# Patient Record
Sex: FEMALE | Race: WHITE
[De-identification: names, ages, dates, MRNs, and addresses within clinical notes are randomized per-mention and may not be internally consistent; named-entity substitution may affect disease eponyms.]

---

## 2021-12-07 ENCOUNTER — HOSPITAL ENCOUNTER (EMERGENCY)
Dept: HOSPITAL 63 - ER | Age: 46
Discharge: HOME | End: 2021-12-07
Payer: COMMERCIAL

## 2021-12-07 VITALS — SYSTOLIC BLOOD PRESSURE: 118 MMHG | DIASTOLIC BLOOD PRESSURE: 87 MMHG

## 2021-12-07 VITALS — HEIGHT: 67 IN | WEIGHT: 197.98 LBS | BODY MASS INDEX: 31.07 KG/M2

## 2021-12-07 DIAGNOSIS — F17.200: ICD-10-CM

## 2021-12-07 DIAGNOSIS — G43.909: Primary | ICD-10-CM

## 2021-12-07 DIAGNOSIS — I25.10: ICD-10-CM

## 2021-12-07 PROCEDURE — 96374 THER/PROPH/DIAG INJ IV PUSH: CPT

## 2021-12-07 PROCEDURE — 96361 HYDRATE IV INFUSION ADD-ON: CPT

## 2021-12-07 PROCEDURE — 96375 TX/PRO/DX INJ NEW DRUG ADDON: CPT

## 2021-12-07 PROCEDURE — 99284 EMERGENCY DEPT VISIT MOD MDM: CPT

## 2021-12-07 NOTE — PHYS DOC
Past History


Past Medical History:  CAD, Migraines


Past Surgical History:  Cholecystectomy, Tubal ligation


Additional Smoking Information:  


vape


Alcohol Use:  Occasionally


Social History Narrative:  THC





General Adult


EDM:


Chief Complaint:  HEADACHE





HPI:


HPI:





Patient is a 46-year-old female that presents with migraine headache.  Patient 

states that her headache started 3 days ago, she states she is normally on 

preventative medications (amitriptyline and carbamazepine) for her migraines, 

states that she moved to the [facility] approximately 4 months ago, has no

t found a new primary care physician, and ran out of her medications 

approximately 2 to 3 weeks ago.  Patient states she is nauseated, has 

photosensitivity, she states she has not vomited.  Patient states that she works

at the [employer] next-door where her son also works, and she states that he will 

give her a ride home.





Review of Systems:


Review of Systems:


Constitutional:  Denies fever or chills 


Eyes:  Denies change in visual acuity 


HENT:  Denies nasal congestion or sore throat 


Respiratory:  Denies cough or shortness of breath 


Cardiovascular:  Denies chest pain or edema 


GI: Nausea denies abdominal pain, vomiting, bloody stools or diarrhea 


: Denies dysuria 


Musculoskeletal:  Denies back pain or joint pain 


Integument:  Denies rash 


Neurologic: Headache, photophobia


Endocrine:  Denies polyuria or polydipsia 


Lymphatic:  Denies swollen glands 


Psychiatric:  Denies depression or anxiety





Current Medications:


Current Meds:





Current Medications








 Medications


  (Trade)  Dose


 Ordered  Sig/Curry  Start Time


 Stop Time Status Last Admin


Dose Admin


 


 Diphenhydramine


 HCl


  (Benadryl)  50 mg  1X  ONCE  12/7/21 15:15


 12/7/21 15:16 UNV  





 


 Ketorolac


 Tromethamine


  (Toradol 30mg


 Vial)  30 mg  1X  ONCE  12/7/21 15:15


 12/7/21 15:16 UNV  





 


 Prochlorperazine


 Edisylate


  (Compazine)  10 mg  1X  ONCE  12/7/21 15:15


 12/7/21 15:16 UNV  





 


 Sodium Chloride  1,000 ml @ 


 1,000 mls/hr  1X  ONCE  12/7/21 15:15


 12/7/21 16:14 UNV  














Physical Exam:


PE:





Constitutional: Well developed, well nourished, mild distress, non-toxic 

appearance. []


HENT: Normocephalic, atraumatic, bilateral external ears normal, oropharynx 

moist, no oral exudates, nose normal. []


Eyes: PERRLA, EOMI, conjunctiva normal, no discharge. [] 


Neck: Normal range of motion, no tenderness, supple, no stridor. [] 


Cardiovascular:Heart rate regular rhythm, no murmur []


Lungs & Thorax:  Bilateral breath sounds clear to auscultation []


Abdomen: Bowel sounds normal, soft, no tenderness, no masses, no pulsatile 

masses. [] 


Skin: Warm, dry, no erythema, no rash. [] 


Back: No tenderness, no CVA tenderness. [] 


Extremities: No tenderness, no cyanosis, no clubbing, ROM intact, no edema. [] 


Neurologic: Alert and oriented X 3, normal motor function, normal sensory 

function, no focal deficits noted. []


Psychologic: Affect normal, judgement normal, mood normal. []





Current Patient Data:


Vital Signs:





Vital Signs








  Date Time  Temp Pulse Resp B/P (MAP) Pulse Ox O2 Delivery O2 Flow Rate FiO2


 


12/7/21 14:58 98.7 68 16 129/81 (97) 98 Room Air  








                                   Vital Signs








  Date Time  Temp Pulse Resp B/P (MAP) Pulse Ox O2 Delivery O2 Flow Rate FiO2


 


12/7/21 14:58 98.7 68 16 129/81 (97) 98 Room Air  











EKG:


EKG:


[]





Radiology/Procedures:


Radiology/Procedures:


[]





Heart Score:


C/O Chest Pain:  N/A


Risk Factors:


Risk Factors:  DM, Current or recent (<one month) smoker, HTN, HLP, family 

history of CAD, obesity.


Risk Scores:


Score 0 - 3:  2.5% MACE over next 6 weeks - Discharge Home


Score 4 - 6:  20.3% MACE over next 6 weeks - Admit for Clinical Observation


Score 7 - 10:  72.7% MACE over next 6 weeks - Early Invasive Strategies





Course & Med Decision Making:


Course & Med Decision Making


Pertinent Labs and Imaging studies reviewed. (See chart for details)





1630 patient states pain is improved and is ready to go home.  Patient will be 

given a list of primary care physicians that she can follow-up with for further 

management of her migraines on an outpatient basis.  No driving or operating 

heavy machinery for the next 12 to 24 hours because of the medications you have 

taken.





Dragon Disclaimer:


Dragon Disclaimer:


This electronic medical record was generated, in whole or in part, using a voice

 recognition dictation system.





Departure


Departure:


Impression:  


   Primary Impression:  


   Migraine


   Qualified Codes:  G43.909 - Migraine, unspecified, not intractable, without 

   status migrainosus


Disposition:  01 HOME / SELF CARE / HOMELESS


Condition:  STABLE


Referrals:  


PCP,MAURICIO (PCP)


Patient Instructions:  Migraine Headache





Additional Instructions:  


May take over-the-counter Tylenol and/or ibuprofen as needed for pain


Follow-up with one of the physicians listed on the paperwork for further 

management of your migraine headaches on an outpatient basis











TRACY LANDAVERDE APRN        Dec 7, 2021 15:24